# Patient Record
Sex: MALE | Race: BLACK OR AFRICAN AMERICAN | NOT HISPANIC OR LATINO | URBAN - METROPOLITAN AREA
[De-identification: names, ages, dates, MRNs, and addresses within clinical notes are randomized per-mention and may not be internally consistent; named-entity substitution may affect disease eponyms.]

---

## 2019-06-12 ENCOUNTER — OFFICE VISIT - HEALTHEAST (OUTPATIENT)
Dept: ADDICTION MEDICINE | Facility: CLINIC | Age: 54
End: 2019-06-12

## 2019-06-12 DIAGNOSIS — F10.21 ALCOHOL USE DISORDER, SEVERE, IN EARLY REMISSION (H): ICD-10-CM

## 2019-06-12 DIAGNOSIS — F14.21 COCAINE USE DISORDER, SEVERE, IN EARLY REMISSION, DEPENDENCE (H): ICD-10-CM

## 2019-06-17 ENCOUNTER — COMMUNICATION - HEALTHEAST (OUTPATIENT)
Dept: ADDICTION MEDICINE | Facility: CLINIC | Age: 54
End: 2019-06-17

## 2021-05-29 NOTE — PROGRESS NOTES
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 recidivism issues and displays moderate vulnerability for further substance use or mental health problems.  (B)  Client has some coping skills inconsistently applied.    REASONS SEVERITY WAS ASSIGNED - (What information did the person provide that indicates his or her understanding of relapse issues? What about the person s experience indicates how prone he or she is to relapse? What coping skills does the person have that decrease relapse potential?)     Patient reports recently completing IOP treatment. Patient is currently looking for aftercare services and more supportive housing. Patient does appears to have knowledge of addiction and recovery. He appears to understand his risk factors for relapse at this time. Patient does appear to continue to be at risk of relapse due to his currently housing situation, current legal involvement, mental health, and lack of insight into other triggers for his use. Patient does appear to have coping skills for his sobriety, however he currently appears to be at risk of relapse.          DIMENSION VI - Recovery Environment   1. Are you employed/attending school? Tell me about that.     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     2A. Describe a typical day; evening for you. Work, school, social, leisure, volunteer, spiritual practices. Include time spent obtaining, using, recovering from drugs or alcohol. (DSM)     Patient reports that a typical day consists of;   Wake up, make plans for the day, self-care, clean his space, shower, breakfast, plan what he needs to get done for the day, contact employers if he does not have work for the day, run to the store, see what skill he can work on for the day, check-in with his family members, talk to his sons, pray, work from 2pm-11pm, sleep.     2B. How often do you spend more time than you planned using or use more than you planned? (DSM)     Patient does endorse spending more time than planned on his use. He states that it happens sometimes, but not a lot.   He also endorses using more than planned, but states that this does not happen a lot.     3. How important is using to your social connections? Do many of your family or friends use?     Patient states that he does not currently have any friends.   He denies any use from his family.     4A. Are you currently in a significant relationship?  no  4B.  If yes, how long?    4C. Sexual Orientation:  Heterosexual    5A. Who do you live with? Strangers in the [facility].  5B. Tell me about their alcohol/drug use and mental health issues. Other's who live there are those with Chemical Health, Mental Health and incarceration issues.  5C. Are you concerned for your safety there? Yes; not all of the time. Is around strangers and he doesn't know what they could do.   5D. Are you concerned about the safety of anyone else who lives with you? no    6A. Do you have children who live with you? no  If the person lives with children, ask follow-up questions to determine the person's relationship and responsibility, both legal and care giving; and what arrangements are made for supervision for the children when the  "person is not available.      6B. Do you have children who do not live with you?  yes, Explain:  2 sons (10, 18)- currently with their mother, 2 daughters (32, 24)  If yes, ask follow up questions to learn where the children are, who has custody and what the person't relaltionship and responsibility is with these children and what hopes the person has for his or her future with these children.    7A. Who supports you in making changes in your alcohol or drug use? What are they willing to do to support you? Who is upset or angry about you making changes in your alcohol or drug use? How big a problem is this for you?      \"Me\"   Sister  Probation      7B. This table is provided to record information about the person s relationships and available support It is not necessary to ask each item; only to get a comprehensive picture of their support system.  How often can you count on the following people when you need someone?   Partner / Spouse    Parent(s)/Aunt(s)/Uncle(s)/Grandparents Always supportive   Sibling(s)/Cousin(s) Always supportive   Child(alycia) Always supportive   Other relative(s)    Friend(s)/neighbor(s)    Child(alycia) s father(s)/mother(s) Never supportive   Support group member(s) Usually supportive   Community of ivett members Always supportive   /counselor/therapist/healer Always supportive   Other (specify) Always supportive     8A. What is your current living situation?     Patient currently lives at [facility].   He does not like it there, and states that it is a place for people coming out of care home, not for him.   Patient states that he feels that it would have set him up for relapse had he not had the skills and knowledge that he has.     8B. What is your long term plan for where you will be living?    Patient states that he just wants to get back to a solid place where he can continue to make life plans.   He states that he just wants to be independent and get a place of his own " where his kids can come and see him.     8C. Tell me about your living environment/neighborhood? Ask enough follow up questions to determine safety, criminal activity, availability of alcohol and drugs, supportive or antagonistic to the person making changes.      Patient states that where he is currently living he does not feel that it is safe.   He states that there are drugs, prostitution and a high rate of crime in the immediate area.     9. Criminal justice history: Gather current/recent history and any significant history related to substance use--Arrests? Convictions? Circumstances? Alcohol or drug involvement? Sentences? Still on probation or parole? Expectations of the court? Current court order? Any sex offenses - lifetime? What level? (DSM)    Patient reports a charge for domestic assault by strangulation for which he currently has a DANCO.   He also endorses driving charges, but denies other legal charges.     10. What obstacles exist to participating in treatment? (Time off work, childcare, funding, transportation, pending [facility] time, living situation)    Work schedule (2pm-11pm). Patient is off  and Monday.      Dimension VI Ratings   Recovery environment - The placing authority must use the criteria in Dimension VI to determine a client s recovery environment.   RISK DESCRIPTIONS - Severity ratin  Client is engaged in structured, meaningful activity, but peers, family, significant other, living environment are unsupportive, or there is criminal justice involvement by the client or among the client's peers, significatn others, or in the client's environment.    REASONS SEVERITY WAS ASSIGNED - (What support does the person have for making changes? What structure/stability does the person have in his or her daily life that willincrease the likelihood that changes can be sustained? What problems exist in the person s environment that will jeopardize getting/staying clean and sober?)     Patient  reports that he currently works full-time so he does appear to have structure for his daily life. Patient was able to identify some hobbies he enjoys, and so appears to have some meaningful activity as well. Patient reports a stable place to live, but states that it is not supportive of his sobriety at all at this time as use and crime are very prevalent. Patient does not currently appear to have support for his sobriety outside of his sister and . Patient is currently on probation, and has a DANCO against him for assaulting the mother of his children.                Client Choice/Exceptions     Would you like services specific to language, age, gender, culture, Latter day preference, race, ethnicity, sexual orientation or disability?  no    If yes, specify:      What particular treatment choices and options would you like to have?  Aftercare, mornings    Do you have a preference for a particular treatment program?  Somewhere with sober housing.         DSM-V Criteria for Substance Abuse  Instructions  Determine whether the client currently meets the criteria for a Substance Use Disorder using the diagnostic criteria in the DSM-V, pp. 481-589. Current means during the most recent 12 months outside a facility that controls access to substances.    Category of substance Severity ICD-10 Code/DSM V Code   [x]  Alcohol Use Disorder [] Mild  [] Moderate  [x] Severe [] (F10.10) (305.00)  [] (F10.20) (303.90)  [x] (F10.20) (303.90)-IN EARLY REMISSION   []  Cannabis Use Disorder [] Mild  [] Moderate  [] Severe [] (F12.10) (305.20)  [] (F12.20) (304.30)  [] (F12.20) (304.30)   [] Hallucinogen Use Disorder [] Mild  [] Moderate  [] Severe [] (F16.10) (305.30)  [] (F16.20) (304.50)  [] (F16.20) (304.50)   []  Inhalant Use Disorder [] Mild  [] Moderate  [] Severe [] (F18.10) (305.90)  [] (F18.20) (304.60)  [] (F18.20) (304.60)   []  Opioid Use Disorder [] Mild  [] Moderate  [] Severe [] (F11.10) (305.50)  []  (F11.20) (304.00)  [] (F11.20) (304.00)   []  Sedative, Hypnotic, or Anxiolytic Use Disorder [] Mild  [] Moderate  [] Severe [] (F13.10) (305.40)   [] (F13.20) (304.10)  [] (F13.20) (304.10)   [x]  Stimulant Related Disorders [] Mild  [] Moderate  [x] Severe [] (F15.10) (305.70) Amphetamine type substance  [] (F14.10) (305.60) Cocaine  [] (F15.10) (305.70) Other or unspecified stimulant  [] (F15.20) (304.40) Amphetamine type substance  [] (F14.20) (304.20) Cocaine  [] (F15.20) (304.40) Other or unspecified stimulant  [] (F15.20) (304.40) Amphetamine type substance  [x] (F14.20) (304.20) Cocaine- IN EARLY REMISSION  [] (F15.20) (304.40) Other or unspecified stimulant   []  Tobacco use Disorder [] Mild  [] Moderate  [] Severe [] (Z72.0) (305.1)  [] (F17.200) (305.1)  [] (F17.200) (305.1)   []  Other (or unknown) Substance Use Disorder [] Mild  [] Moderate  [] Severe [] (F19.10) (305.90)  [] (F19.20) (304.90)  [] (F19.20) (304.90)       Suggested Level of Care Necessary for Recovery  []  Inpatient  []  Extended Care []  Residential [x]  Outpatient  []  None            Collateral Contact Summary   Number of contacts made:  2   Contact with referring person:  yes     If court related records were reviewed, summarize here:  NA     [x]   Information from collateral contacts supported/largely agreed with information from the client and associated risk ratings.   []   Information from collateral contacts was significantly different from information from the client and lead to different risk ratings.      Summarize new information here:      Rule 25 Assessment Summary and Plan   's Recommendation    Based on the information gathered in this assessment and from collateral information, the client meets DSM IV criteria for Alcohol Use Disorder, Severe (F10.20)- In Early Remission, and Stimulant Use Disorder, Cocaine, Severe (F14.20)- In Early Remission.  Patient is recommended to attend a low intensity aftercare program.    Patient may benefit from more supportive sober housing.   Patient to comply with all probation requirements and recommendations.   Patient to remain free from use of all non-prescribed mood altering substances.   This assessment can be updated with additional information within a 6 month period.      Collateral Contacts     Please duplicate this page for each contact.  If this includes information which is sensitive and not public, separate this page from the rest of the assessment before sharing.  Retain the page in the assessment file.   [facility]    [facility] Relationship     Phone Number    [phone]-phone  [phone]- fax Releases    yes       Information Provided:      Lori provided the following information on 6/17/19 @ 1340;    The reports from Jessie Blackmon are very good and thorough.   Patient is now living at [facility].   There have not been any bad reports about the patient from there, but the patient feels that the program is not good for him.   [facility] with housing.   Patient is looking for more sober housing.   He does not have any positive UAs from the patient.   Patient has been making all of their meetings.   He does have a few more things to do that he hasn't started yet, but has time as he will be on probation with him until 2022.   The patient is one of the older clients that he has.   Patient is on probation for domestic assault; felony, with a stay of imposition.   Patient also has some other probation to the court that he needs to work out, due to theft charges.       Attempted to contact [facility] 6/13/19 @ 0701. No answer, left message to return call with any information.   Attempted to contact [facility] 6/17/19 @ 0152. No answer, left message to return call with any information by the end of the day.     Collateral Contacts     Name    Kevin/Obi   Relationship     Phone Number    [phone] Releases    yes        Information Provided:       Kevin provided the patient's mental health evaluation and treatment information from [facility].     Documents indicated a mental health diagnosis of depression.   Documents verified that patient was enrolled and completed [facility]'s IOP program, and was recommended to attend aftercare while living at the [facility].   Information largely coincided with the information that the patient presented throughout this assessment.   Attempted to contact Kevin 6/13/19 @ 9032. No answer, left message to return call with any information.               A problematic pattern of alcohol/drug use leading to clinically significant impairment or distress, as manifested by at least two of the following, occurring within a 12-month period:      Specify if: In early remission:  After full criteria for alcohol/drug use disorder were previously met, none of the criteria for alcohol/drug use disorder have been met for at least 3 months but for less than 12 months (with the exception that Criterion A4,  Craving or a strong desire or urge to use alcohol/drug  may be met).     In sustained remission:   After full criteria for alcohol use disorder were previously met, none of the criteria for alcohol/drug use disorder have been met at any time during a period of 12 months or longer (with the exception that Criterion A4,  Craving or strong desire or urge to use alcohol/drug  may be met).   Specify if:   This additional specifier is used if the individual is in an environment where access to alcohol is restricted.    Mild: Presence of 2-3 symptoms  Moderate: Presence of 4-5 symptoms  Severe: Presence of 6 or more symptoms-ALCOHOL (in early remission), COCAINE (in early remission)           Staff Name and Title:  AURA Garza    Date:  6/12/2019  Time:  8:36 AM